# Patient Record
Sex: FEMALE | Race: AMERICAN INDIAN OR ALASKA NATIVE | Employment: UNEMPLOYED | URBAN - METROPOLITAN AREA
[De-identification: names, ages, dates, MRNs, and addresses within clinical notes are randomized per-mention and may not be internally consistent; named-entity substitution may affect disease eponyms.]

---

## 2019-06-18 ENCOUNTER — APPOINTMENT (OUTPATIENT)
Dept: GENERAL RADIOLOGY | Facility: CLINIC | Age: 59
End: 2019-06-18
Attending: PHYSICIAN ASSISTANT
Payer: COMMERCIAL

## 2019-06-18 ENCOUNTER — APPOINTMENT (OUTPATIENT)
Dept: CT IMAGING | Facility: CLINIC | Age: 59
End: 2019-06-18
Attending: PHYSICIAN ASSISTANT
Payer: COMMERCIAL

## 2019-06-18 ENCOUNTER — HOSPITAL ENCOUNTER (OUTPATIENT)
Facility: CLINIC | Age: 59
Setting detail: OBSERVATION
Discharge: HOME | End: 2019-06-19
Attending: EMERGENCY MEDICINE | Admitting: HOSPITALIST
Payer: COMMERCIAL

## 2019-06-18 DIAGNOSIS — R07.9 CHEST PAIN, UNSPECIFIED TYPE: ICD-10-CM

## 2019-06-18 DIAGNOSIS — E11.65 TYPE II OR UNSPECIFIED TYPE DIABETES MELLITUS WITH NEUROLOGICAL MANIFESTATIONS, UNCONTROLLED(250.62) (H): ICD-10-CM

## 2019-06-18 DIAGNOSIS — E11.49 TYPE II OR UNSPECIFIED TYPE DIABETES MELLITUS WITH NEUROLOGICAL MANIFESTATIONS, UNCONTROLLED(250.62) (H): ICD-10-CM

## 2019-06-18 DIAGNOSIS — R07.9 ACUTE CHEST PAIN: Primary | ICD-10-CM

## 2019-06-18 LAB
ALBUMIN SERPL-MCNC: 3.5 G/DL (ref 3.4–5)
ALP SERPL-CCNC: 117 U/L (ref 40–150)
ALT SERPL W P-5'-P-CCNC: 21 U/L (ref 0–50)
ANION GAP SERPL CALCULATED.3IONS-SCNC: 7 MMOL/L (ref 3–14)
AST SERPL W P-5'-P-CCNC: 16 U/L (ref 0–45)
BASOPHILS # BLD AUTO: 0 10E9/L (ref 0–0.2)
BASOPHILS NFR BLD AUTO: 0.5 %
BILIRUB SERPL-MCNC: 0.5 MG/DL (ref 0.2–1.3)
BUN SERPL-MCNC: 19 MG/DL (ref 7–30)
CALCIUM SERPL-MCNC: 8.5 MG/DL (ref 8.5–10.1)
CHLORIDE SERPL-SCNC: 113 MMOL/L (ref 94–109)
CO2 SERPL-SCNC: 23 MMOL/L (ref 20–32)
CREAT SERPL-MCNC: 0.92 MG/DL (ref 0.52–1.04)
D DIMER PPP FEU-MCNC: 0.9 UG/ML FEU (ref 0–0.5)
DIFFERENTIAL METHOD BLD: NORMAL
EOSINOPHIL # BLD AUTO: 0 10E9/L (ref 0–0.7)
EOSINOPHIL NFR BLD AUTO: 0.7 %
ERYTHROCYTE [DISTWIDTH] IN BLOOD BY AUTOMATED COUNT: 12.9 % (ref 10–15)
GFR SERPL CREATININE-BSD FRML MDRD: 69 ML/MIN/{1.73_M2}
GLUCOSE BLDC GLUCOMTR-MCNC: 142 MG/DL (ref 70–99)
GLUCOSE BLDC GLUCOMTR-MCNC: 170 MG/DL (ref 70–99)
GLUCOSE BLDC GLUCOMTR-MCNC: 227 MG/DL (ref 70–99)
GLUCOSE SERPL-MCNC: 256 MG/DL (ref 70–99)
HCT VFR BLD AUTO: 35.5 % (ref 35–47)
HGB BLD-MCNC: 11.9 G/DL (ref 11.7–15.7)
IMM GRANULOCYTES # BLD: 0 10E9/L (ref 0–0.4)
IMM GRANULOCYTES NFR BLD: 0.2 %
INTERPRETATION ECG - MUSE: NORMAL
INTERPRETATION ECG - MUSE: NORMAL
LIPASE SERPL-CCNC: 106 U/L (ref 73–393)
LYMPHOCYTES # BLD AUTO: 1.9 10E9/L (ref 0.8–5.3)
LYMPHOCYTES NFR BLD AUTO: 31.6 %
MCH RBC QN AUTO: 29.4 PG (ref 26.5–33)
MCHC RBC AUTO-ENTMCNC: 33.5 G/DL (ref 31.5–36.5)
MCV RBC AUTO: 88 FL (ref 78–100)
MONOCYTES # BLD AUTO: 0.3 10E9/L (ref 0–1.3)
MONOCYTES NFR BLD AUTO: 4.6 %
NEUTROPHILS # BLD AUTO: 3.7 10E9/L (ref 1.6–8.3)
NEUTROPHILS NFR BLD AUTO: 62.4 %
NRBC # BLD AUTO: 0 10*3/UL
NRBC BLD AUTO-RTO: 0 /100
PLATELET # BLD AUTO: 205 10E9/L (ref 150–450)
POTASSIUM SERPL-SCNC: 4.5 MMOL/L (ref 3.4–5.3)
PROT SERPL-MCNC: 8.1 G/DL (ref 6.8–8.8)
RBC # BLD AUTO: 4.05 10E12/L (ref 3.8–5.2)
SODIUM SERPL-SCNC: 143 MMOL/L (ref 133–144)
TROPONIN I SERPL-MCNC: <0.015 UG/L (ref 0–0.04)
WBC # BLD AUTO: 5.9 10E9/L (ref 4–11)

## 2019-06-18 PROCEDURE — 25000131 ZZH RX MED GY IP 250 OP 636 PS 637: Performed by: PHYSICIAN ASSISTANT

## 2019-06-18 PROCEDURE — 00000146 ZZHCL STATISTIC GLUCOSE BY METER IP

## 2019-06-18 PROCEDURE — 36415 COLL VENOUS BLD VENIPUNCTURE: CPT | Performed by: PHYSICIAN ASSISTANT

## 2019-06-18 PROCEDURE — 93005 ELECTROCARDIOGRAM TRACING: CPT

## 2019-06-18 PROCEDURE — 84484 ASSAY OF TROPONIN QUANT: CPT | Performed by: PHYSICIAN ASSISTANT

## 2019-06-18 PROCEDURE — 25000132 ZZH RX MED GY IP 250 OP 250 PS 637: Performed by: PHYSICIAN ASSISTANT

## 2019-06-18 PROCEDURE — 96361 HYDRATE IV INFUSION ADD-ON: CPT

## 2019-06-18 PROCEDURE — 99285 EMERGENCY DEPT VISIT HI MDM: CPT | Mod: 25

## 2019-06-18 PROCEDURE — 96360 HYDRATION IV INFUSION INIT: CPT | Mod: 59

## 2019-06-18 PROCEDURE — 85025 COMPLETE CBC W/AUTO DIFF WBC: CPT | Performed by: PHYSICIAN ASSISTANT

## 2019-06-18 PROCEDURE — 99220 ZZC INITIAL OBSERVATION CARE,LEVL III: CPT | Performed by: PHYSICIAN ASSISTANT

## 2019-06-18 PROCEDURE — 80053 COMPREHEN METABOLIC PANEL: CPT | Performed by: PHYSICIAN ASSISTANT

## 2019-06-18 PROCEDURE — 85379 FIBRIN DEGRADATION QUANT: CPT | Performed by: PHYSICIAN ASSISTANT

## 2019-06-18 PROCEDURE — 71260 CT THORAX DX C+: CPT

## 2019-06-18 PROCEDURE — 25000128 H RX IP 250 OP 636: Performed by: PHYSICIAN ASSISTANT

## 2019-06-18 PROCEDURE — G0378 HOSPITAL OBSERVATION PER HR: HCPCS

## 2019-06-18 PROCEDURE — 71046 X-RAY EXAM CHEST 2 VIEWS: CPT

## 2019-06-18 PROCEDURE — 83690 ASSAY OF LIPASE: CPT | Performed by: PHYSICIAN ASSISTANT

## 2019-06-18 PROCEDURE — 25000125 ZZHC RX 250: Performed by: PHYSICIAN ASSISTANT

## 2019-06-18 RX ORDER — ASPIRIN 81 MG/1
81 TABLET ORAL DAILY
Status: DISCONTINUED | OUTPATIENT
Start: 2019-06-19 | End: 2019-06-19 | Stop reason: HOSPADM

## 2019-06-18 RX ORDER — LISINOPRIL 20 MG/1
20 TABLET ORAL DAILY
COMMUNITY

## 2019-06-18 RX ORDER — NICOTINE POLACRILEX 4 MG
15-30 LOZENGE BUCCAL
Status: DISCONTINUED | OUTPATIENT
Start: 2019-06-18 | End: 2019-06-19 | Stop reason: HOSPADM

## 2019-06-18 RX ORDER — ACETAMINOPHEN 325 MG/1
650 TABLET ORAL EVERY 4 HOURS PRN
Status: DISCONTINUED | OUTPATIENT
Start: 2019-06-18 | End: 2019-06-19 | Stop reason: HOSPADM

## 2019-06-18 RX ORDER — IOPAMIDOL 755 MG/ML
500 INJECTION, SOLUTION INTRAVASCULAR ONCE
Status: COMPLETED | OUTPATIENT
Start: 2019-06-18 | End: 2019-06-18

## 2019-06-18 RX ORDER — ALUMINA, MAGNESIA, AND SIMETHICONE 2400; 2400; 240 MG/30ML; MG/30ML; MG/30ML
30 SUSPENSION ORAL EVERY 4 HOURS PRN
Status: DISCONTINUED | OUTPATIENT
Start: 2019-06-18 | End: 2019-06-19 | Stop reason: HOSPADM

## 2019-06-18 RX ORDER — NITROGLYCERIN 0.4 MG/1
0.4 TABLET SUBLINGUAL ONCE
Status: COMPLETED | OUTPATIENT
Start: 2019-06-18 | End: 2019-06-18

## 2019-06-18 RX ORDER — NALOXONE HYDROCHLORIDE 0.4 MG/ML
.1-.4 INJECTION, SOLUTION INTRAMUSCULAR; INTRAVENOUS; SUBCUTANEOUS
Status: DISCONTINUED | OUTPATIENT
Start: 2019-06-18 | End: 2019-06-19 | Stop reason: HOSPADM

## 2019-06-18 RX ORDER — ROSUVASTATIN CALCIUM 20 MG/1
20 TABLET, COATED ORAL EVERY EVENING
Status: DISCONTINUED | OUTPATIENT
Start: 2019-06-18 | End: 2019-06-19 | Stop reason: HOSPADM

## 2019-06-18 RX ORDER — LIDOCAINE 40 MG/G
CREAM TOPICAL
Status: DISCONTINUED | OUTPATIENT
Start: 2019-06-18 | End: 2019-06-19 | Stop reason: HOSPADM

## 2019-06-18 RX ORDER — NITROGLYCERIN 0.4 MG/1
0.4 TABLET SUBLINGUAL EVERY 5 MIN PRN
Status: DISCONTINUED | OUTPATIENT
Start: 2019-06-18 | End: 2019-06-19 | Stop reason: HOSPADM

## 2019-06-18 RX ORDER — TRAMADOL HYDROCHLORIDE 50 MG/1
50-100 TABLET ORAL EVERY 6 HOURS PRN
Status: DISCONTINUED | OUTPATIENT
Start: 2019-06-18 | End: 2019-06-19 | Stop reason: HOSPADM

## 2019-06-18 RX ORDER — ASPIRIN 81 MG/1
324 TABLET, CHEWABLE ORAL ONCE
Status: COMPLETED | OUTPATIENT
Start: 2019-06-18 | End: 2019-06-18

## 2019-06-18 RX ORDER — DEXTROSE MONOHYDRATE 25 G/50ML
25-50 INJECTION, SOLUTION INTRAVENOUS
Status: DISCONTINUED | OUTPATIENT
Start: 2019-06-18 | End: 2019-06-19 | Stop reason: HOSPADM

## 2019-06-18 RX ORDER — ROSUVASTATIN CALCIUM 20 MG/1
20 TABLET, COATED ORAL EVERY EVENING
COMMUNITY

## 2019-06-18 RX ORDER — LISINOPRIL 20 MG/1
20 TABLET ORAL DAILY
Status: DISCONTINUED | OUTPATIENT
Start: 2019-06-18 | End: 2019-06-19 | Stop reason: HOSPADM

## 2019-06-18 RX ORDER — PROPRANOLOL HYDROCHLORIDE 10 MG/1
10 TABLET ORAL DAILY
COMMUNITY

## 2019-06-18 RX ORDER — GABAPENTIN 600 MG/1
600 TABLET ORAL 3 TIMES DAILY
Status: DISCONTINUED | OUTPATIENT
Start: 2019-06-18 | End: 2019-06-19 | Stop reason: HOSPADM

## 2019-06-18 RX ORDER — GABAPENTIN 600 MG/1
600 TABLET ORAL 3 TIMES DAILY
COMMUNITY

## 2019-06-18 RX ADMIN — GABAPENTIN 600 MG: 600 TABLET, FILM COATED ORAL at 21:10

## 2019-06-18 RX ADMIN — INSULIN GLARGINE 30 UNITS: 100 INJECTION, SOLUTION SUBCUTANEOUS at 21:10

## 2019-06-18 RX ADMIN — NITROGLYCERIN 0.4 MG: 0.4 TABLET SUBLINGUAL at 21:07

## 2019-06-18 RX ADMIN — NITROGLYCERIN 0.4 MG: 0.4 TABLET SUBLINGUAL at 21:56

## 2019-06-18 RX ADMIN — NITROGLYCERIN 0.4 MG: 0.4 TABLET SUBLINGUAL at 15:20

## 2019-06-18 RX ADMIN — NITROGLYCERIN 0.4 MG: 0.4 TABLET SUBLINGUAL at 17:31

## 2019-06-18 RX ADMIN — LISINOPRIL 20 MG: 20 TABLET ORAL at 21:10

## 2019-06-18 RX ADMIN — LIDOCAINE HYDROCHLORIDE 30 ML: 20 SOLUTION ORAL; TOPICAL at 17:32

## 2019-06-18 RX ADMIN — SODIUM CHLORIDE 75 ML: 9 INJECTION, SOLUTION INTRAVENOUS at 15:28

## 2019-06-18 RX ADMIN — IOPAMIDOL 58 ML: 755 INJECTION, SOLUTION INTRAVENOUS at 15:28

## 2019-06-18 RX ADMIN — TRAMADOL HYDROCHLORIDE 50 MG: 50 TABLET, COATED ORAL at 19:25

## 2019-06-18 RX ADMIN — ASPIRIN 81 MG 324 MG: 81 TABLET ORAL at 14:42

## 2019-06-18 RX ADMIN — ACETAMINOPHEN 650 MG: 325 TABLET, FILM COATED ORAL at 21:10

## 2019-06-18 RX ADMIN — ROSUVASTATIN CALCIUM 20 MG: 20 TABLET, FILM COATED ORAL at 21:10

## 2019-06-18 ASSESSMENT — ENCOUNTER SYMPTOMS
NAUSEA: 1
SHORTNESS OF BREATH: 1
COUGH: 0

## 2019-06-18 ASSESSMENT — MIFFLIN-ST. JEOR
SCORE: 1210.04
SCORE: 1232.72

## 2019-06-18 NOTE — ED NOTES
[facility]  ED Nurse Handoff Report    Natasha Matson is a 58 year old female   ED Chief complaint: Chest Pain and Shortness of Breath  . ED Diagnosis:   Final diagnoses:   Chest pain, unspecified type     Allergies:   Allergies   Allergen Reactions     Lyrica [Pregabalin]      Nausea     Sulfa Drugs        Code Status: Full Code  Activity level - Baseline/Home:  Independent. Activity Level - Current:   Stand by Assist. Lift room needed: No. Bariatric: No   Needed: No   Isolation: No. Infection: Not Applicable.     Vital Signs:   Vitals:    06/18/19 1525 06/18/19 1730 06/18/19 1735 06/18/19 1740   BP:  (!) 172/98     Pulse:  83     Resp: 24      Temp:       TempSrc:       SpO2: 100%  99% 100%   Weight:       Height:         Cardiac Rhythm:  ,       Sinus tachycardia  Otherwise normal ECG  No previous ECGs available  Pain level: 0-10 Pain Scale: 8  Patient confused: No. Patient Falls Risk: Yes.   Elimination Status: Has voided   Patient Report / Focused Assessment:    Pt had back pain a couple weeks ago and her daughter was applying pressure to her back and reportedly broke rib on left side.  Pt now has shortness of breath and left sided anterior chest pain.    Tests PerformED / Abnormal Results:    CT Chest Pulmonary Embolism w Contrast   Final Result   IMPRESSION:   1. There is no pulmonary embolus, aortic aneurysm or dissection.   2. Coronary artery atherosclerotic calcifications.       AVIVA NUNN MD      XR Chest 2 Views   Final Result   IMPRESSION: No acute abnormality.      AVIVA NUNN MD      Treatments provided: PIV, LABS, MEDS, XRAY, CT, BP AND PULSE OX MONITORING. EKG  Family Comments: AT BEDSIDE  OBS brochure/video discussed/provided to patient:  Yes  ED Medications:   Medications   aspirin (ASA) chewable tablet 324 mg (324 mg Oral Given 6/18/19 1442)   nitroGLYcerin (NITROSTAT) sublingual tablet 0.4 mg (0.4 mg Sublingual Given 6/18/19 1520)   0.9% sodium chloride  BOLUS (75 mLs Intravenous New Bag 6/18/19 1528)   iopamidol (ISOVUE-370) solution 500 mL (58 mLs Intravenous Given 6/18/19 1528)   nitroGLYcerin (NITROSTAT) sublingual tablet 0.4 mg (0.4 mg Sublingual Given 6/18/19 1731)   lidocaine HCl (XYLOCAINE) 2 % 15 mL, alum & mag hydroxide-simethicone (MYLANTA ES/MAALOX  ES) 15 mL GI Cocktail (30 mLs Oral Given 6/18/19 1732)     Drips infusing:  Yes  For the majority of the shift, the patient's behavior Green. Interventions performed were NONE.    Severe Sepsis OR Septic Shock Diagnosis Present: No    ED Nurse Name/Phone Number: Chemo Lacey RN [phone]  5:15 PM    RECEIVING UNIT ED HANDOFF REVIEW    Above ED Nurse Handoff Report was reviewed: Yes  Reviewed by: Keerthi Gutierrez on June 18, 2019 at 5:42 PM

## 2019-06-18 NOTE — H&P
[facility] / Observation Unit  History and Physical Exam     Natasha Matson MRN# [id number]   YOB: 1960 Age: 58 year old      Date of Admission:  6/18/2019    Primary care provider: No Ref-Primary, Physician          Assessment:   Natasha Matson is a 58 year old female with a PMH significant for DM with neuropathy, hx of CVA, HTN, and HLP, who presents with chest pain.   Work up in ED reveals: VSS. CMP and CBC are unremarkable other than glucose of 256. Lipase is normal and troponin is undetectable. D-dimer is elevated at 0.9 and CT of the chest was negative for PE. CXR is clear, old rib fractures. EKG is sinus tachycardia. She received 324 mg PO ASA, GI cocktail and sublingual nitroglycerin x2.   Patient is being registered to observation for further evaluation and to rule out possible ACS.     1. Acute Chest pain: likely musculoskeletal given recent hx of possible fractured ribs but pain today is in a different location and has multiple risk factors for CAD; DM, HTN, HLP, tobacco use, CVA and family hx of early CAD. Initial work up is non ischemic. Will monitor on tele, trend troponins and get stress echo in AM.  2. DM with neuropathy - resume home Lantus tonight and gabapentin. Will hold meal time insulin and Januvia. Hold morning Lantus until cleared for PO intake.   3. HTN - resume home lisinopril, hold propranolol  4. HLP - resume statin         Plan:     1. [place] to Observation  2. Continue telemetry  3. Follow serial troponins   4. Stress testing with Stress Echo  5. Cont Aspirin EC 81 mg po daily  6. Blood pressure control  7. Morphine and nitroglycerine PRN for pain    8. regular diet, No caffeine if Nuclear testing selected  9. DVT prophylaxis: pt at low risk, encourage ambulation  10. Code Status: full code  11. Dispo: anticipate discharge tomorrow                  Chief Complaint:   Chest Pain         History of Present Illness:   Natasha Matson is a 58 year old female  with a PMH significant for DM with neuropathy, hx of CVA, HTN, and HLP, who presents with chest pain. Pt reports onset of anterior left sided chest pain this morning while at rest. The was associated with nausea and vomiting. She denies any relieving or exacerbating factors. She denies association to exertion. She notes worsening pain with deep breath but relates this to her lateral ribs. She notes associated SOB and clamminess as well. She also notes episodes of nausea and clamminess over the past 3 days that is not related to exertion. She notes that she had her daughter crack her back 2 weeks and she felt a pop in her ribs. She was evaluated, pt reports fracturing ribs on both sides, per chart review, there were old fractures noted on the left, no acute appearing fractures. She denies fever, chills, cough, abdominal pain, diarrhea, constipation or dysuria. She notes SOB with lying down. She notes tobacco use, 1-2 cigarettes per day. Denies alcohol use. She notes hx of early CAD in her mother,  of CAD in her 60s.              Past Medical History:     Past Medical History:   Diagnosis Date     Essential hypertension, benign      High cholesterol      Polyneuropathy in diabetes (H)      Type II or unspecified type diabetes mellitus without mention of complication, not stated as uncontrolled      Type II or unspecified type diabetes mellitus without mention of complication, uncontrolled 3/21/07    Hospitalized               Past Surgical History:     Past Surgical History:   Procedure Laterality Date     C TOTAL ABDOM HYSTERECTOMY      uncertain if ovaries removed     HC TYMPANOPLASTY W/O MASTOID, INIT/REV W/O OSS CHAIN RECONST  1995    right ear               Social History:     Social History     Socioeconomic History     Marital status:      Spouse name: Not on file     Number of children: Not on file     Years of education: Not on file     Highest education level: Not on file   Occupational  History     Not on file   Social Needs     Financial resource strain: Not on file     Food insecurity:     Worry: Not on file     Inability: Not on file     Transportation needs:     Medical: Not on file     Non-medical: Not on file   Tobacco Use     Smoking status: Former Smoker     Packs/day: 0.10     Types: Cigarettes     Last attempt to quit: 2010     Years since quittin.7     Tobacco comment: 1 cig/day   Substance and Sexual Activity     Alcohol use: No     Drug use: No     Sexual activity: Not on file   Lifestyle     Physical activity:     Days per week: Not on file     Minutes per session: Not on file     Stress: Not on file   Relationships     Social connections:     Talks on phone: Not on file     Gets together: Not on file     Attends [employer] service: Not on file     Active member of club or organization: Not on file     Attends meetings of clubs or organizations: Not on file     Relationship status: Not on file     Intimate partner violence:     Fear of current or ex partner: Not on file     Emotionally abused: Not on file     Physically abused: Not on file     Forced sexual activity: Not on file   Other Topics Concern     Not on file   Social History Narrative     Not on file               Family History:   Mother - DM  Sister - DM         Allergies:      Allergies   Allergen Reactions     Lyrica [Pregabalin]      Nausea     Sulfa Drugs                Medications:     Prior to Admission medications    Medication Sig Last Dose Taking? Auth Provider   ASPIRIN 81 MG OR TABS one daily 2019 at am Yes Meredith Stanley PA-C   gabapentin (NEURONTIN) 600 MG tablet Take 600 mg by mouth 3 times daily 2019 at Unknown time Yes Unknown, Entered By History   insulin aspart (NOVOLOG FLEXPEN) 100 UNIT/ML pen Inject 2-8 Units Subcutaneous 3 times daily (with meals) Per sliding scale  Yes Unknown, Entered By History   insulin glargine (LANTUS SOLOSTAR PEN) 100 UNIT/ML pen Inject 30 Units  "Subcutaneous 2 times daily 6/17/2019 at Unknown time Yes Unknown, Entered By History   lisinopril (PRINIVIL/ZESTRIL) 20 MG tablet Take 20 mg by mouth daily 6/17/2019 at am Yes Unknown, Entered By History   propranolol (INDERAL) 10 MG tablet Take 10 mg by mouth daily 6/17/2019 at am Yes Unknown, Entered By History   rosuvastatin (CRESTOR) 20 MG tablet Take 20 mg by mouth every evening 6/17/2019 at Unknown time Yes Unknown, Entered By History   sitagliptin (JANUVIA) 100 MG tablet Take 100 mg by mouth daily 6/17/2019 at am Yes Unknown, Entered By History   traMADol (ULTRAM) 50 MG tablet Take 1-2 tablets by mouth every 6 hours as needed for pain.  Yes Tani Mancilla MD   ALCOHOL WIPES 70 % PADS single use pads   Nitesh Cheek MD   aspirin 325 MG tablet Take 1 tablet by mouth daily for 14 days.   Tani Mancilla MD   glucose blood VI test strips (ONE TOUCH ULTRA TEST) strip 1 strip by In Vitro route 4 times daily (before meals and nightly).   Meredith Stanley PA-C   Insulin Pen Needle (B-D U/F PEN NEEDLE) 31G X 8 MM MISC 1 each 3 times daily (before meals).   Meagan Arreaga, NP   INSULIN SYRINGE 1CCC/30GX1/2\" MISC per patient preference   Jackie Patel, CNP   lisinopril (PRINIVIL,ZESTRIL) 10 MG tablet Take 1 tablet by mouth daily for 14 days.   Tani Mancilla MD   ONETOUCH ULTRASOFT LANCETS MISC test blood sugar 4 times per day and as needed    Nitesh Cheek MD   ORDER FOR DME needle disposition container   Nitesh Cheek MD              Review of Systems:   A Comprehensive greater than 10 system review of systems was carried out.  Pertinent positives and negatives are noted above.  Otherwise negative for contributory information.     Constitutional, neuro, ENT, endocrine, pulmonary, cardiac, gastrointestinal, genitourinary, musculoskeletal, integument and psychiatric systems are negative, except as otherwise noted.           Physical Exam:   Blood pressure 165/88, " "pulse 89, temperature 98.6  F (37  C), temperature source Oral, resp. rate 28, height 1.626 m (5' 4\"), weight 63.5 kg (140 lb), SpO2 100 %.    GENERAL:  Comfortable.  PSYCH: pleasant, oriented, No acute distress.  HEENT:  Atraumatic, normocephalic. Normal conjunctiva, normal hearing, and oropharynx is normal.  NECK:  Supple, no neck vein distention.  HEART:  Normal S1, S2 with no murmur, no pericardial rub, gallops or S3 or S4.  LUNGS:  Clear to auscultation, normal Respiratory effort. No wheezing, rales or ronchi.  GI:  Soft, normal bowel sounds. Non-tender, non distended.   EXTREMITIES:  No pedal edema, +2 pulses bilateral and equal.  SKIN:  Dry to touch, No rash, wound or ulcerations.  NEUROLOGIC:  CN 2-12 intact, BL 5/5 symmetric upper and lower extremity strength, sensation is intact with no focal deficits.              Data:     EKG demonstrates:  sinus tachycardia.    Recent Labs   Lab 06/18/19  1420   WBC 5.9   HGB 11.9   HCT 35.5   MCV 88        Recent Labs   Lab 06/18/19  1420      POTASSIUM 4.5   CHLORIDE 113*   CO2 23   ANIONGAP 7   *   BUN 19   CR 0.92   GFRESTIMATED 69   GFRESTBLACK 80   RODERICK 8.5   PROTTOTAL 8.1   ALBUMIN 3.5   BILITOTAL 0.5   ALKPHOS 117   AST 16   ALT 21     Recent Labs   Lab 06/18/19  1420   DD 0.9*     Recent Labs   Lab 06/18/19  1420   LIPASE 106     Recent Labs   Lab 06/18/19  1420   TROPI <0.015       Recent Results (from the past 48 hour(s))   XR Chest 2 Views    Narrative    CHEST TWO VIEW   6/18/2019 3:05 PM     HISTORY: Chest pain.    COMPARISON: 3/20/2007.    FINDINGS: The heart size is normal. The lungs are clear. No  pneumothorax or pleural effusion. Old bilateral rib fractures. Old  left clavicle fracture.      Impression    IMPRESSION: No acute abnormality.    AVIVA NUNN MD   CT Chest Pulmonary Embolism w Contrast    Narrative    CT CHEST PULMONARY EMBOLISM WITH CONTRAST  6/18/2019 3:38 PM    HISTORY:  Chest pain and shortness of breath. Positive " D-dimer.    TECHNIQUE: Scans obtained from the apices through the diaphragm with  IV contrast. 58 mL Isovue-370 injected. Radiation dose for this scan  was reduced using automated exposure control, adjustment of the mA  and/or kV according to patient size, or iterative reconstruction  technique.    COMPARISON:  None.    FINDINGS:  Evaluation of the pulmonary arterial system shows no  evidence of embolus. The thoracic aorta is calcified and slightly  tortuous. No aortic aneurysm or dissection. There are coronary artery  atherosclerotic calcifications. The heart size is normal. There is no  mediastinal, hilar or axillary lymph node enlargement. There is mild  dependent atelectasis bilaterally. The lungs are otherwise clear. No  pneumothorax or pleural effusion. Multiple old bilateral rib  fractures. Images through the upper abdomen show no acute  abnormalities. The gallbladder is absent.      Impression    IMPRESSION:  1. There is no pulmonary embolus, aortic aneurysm or dissection.  2. Coronary artery atherosclerotic calcifications.     MD Cally SOFIA PA-C

## 2019-06-18 NOTE — ED PROVIDER NOTES
Emergency Department Attending Supervision Note  6/18/2019  4:01 PM    I evaluated this patient in conjunction with Isaac Simms PA-C    Briefly, the patient presented with chest pain.  1 hour PTA, patient developed left sided, non-radiating chest pain.  No associated nausea, vomiting, or diaphoresis.  No clear exacerbating or alleviating factors.  Hx of previous CVA, PFO, HTN, HLD, DM.  No previous CAD.  Daughter providing chest massage recently, and believes she may have caused an acute on chronic fracture to the patient's ribs she has noted persistent right-sided chest discomfort since that time.    On my exam:  Resting comfortably  Lungs CTAB  RRR, S1 and S2 present  No tenderness to palpation over chest wall  No LE edema or asymmetry    Results:  Labs Ordered and Resulted from Time of ED Arrival Up to the Time of Departure from the ED   COMPREHENSIVE METABOLIC PANEL - Abnormal; Notable for the following components:       Result Value    Chloride 113 (*)     Glucose 256 (*)     All other components within normal limits   D DIMER QUANTITATIVE - Abnormal; Notable for the following components:    D Dimer 0.9 (*)     All other components within normal limits   GLUCOSE BY METER - Abnormal; Notable for the following components:    Glucose 227 (*)     All other components within normal limits   CBC WITH PLATELETS DIFFERENTIAL   LIPASE   TROPONIN I   CARDIAC CONTINUOUS MONITORING     CT Chest Pulmonary Embolism w Contrast   Final Result   IMPRESSION:   1. There is no pulmonary embolus, aortic aneurysm or dissection.   2. Coronary artery atherosclerotic calcifications.       AVIVA NUNN MD      XR Chest 2 Views   Final Result   IMPRESSION: No acute abnormality.      AVIVA NUNN MD        ED course:    My impression is chest pain.  Precise etiology not clear.  EKG demonstrates sinus rhythm, without evidence of ST segment elevation or depression, though subtle T wave inversion in lead aVL which appears new compared  with previous.  Repeat EKG demonstrates no interval ST segment elevation or depression.  Initial troponin returned undetectable.  Patient does have multiple risk factors including previous CVA, hypertension, hyperlipidemia, and diabetes as well as elevated HEART Score.  Patient provided the above interventions, noting improvements in symptoms, and was essentially chest pain-free following sublingual nitroglycerin.  No evidence of PE based on advanced imaging or acute aortic pathology.  Will be been to the observation unit for further treatment and care.    Diagnosis    ICD-10-CM    1. Chest pain, unspecified type R07.9          Geraldo Hooper MD Roach, Brian Donald, MD  06/18/19 1829

## 2019-06-18 NOTE — PHARMACY-ADMISSION MEDICATION HISTORY
Admission medication history interview status for this patient is complete. See [facility] admission navigator for allergy information, prior to admission medications and immunization status.     Medication history interview source(s):Patient and Family  Medication history resources (including written lists, pill bottles, clinic record):None  [facility]    Changes made to PTA medication list:  Added: januvia, gabapentin, propranolol, crestor  Deleted: simvastatin,   Changed: lantus, novolog, lisinopril    Actions taken by pharmacist (provider contacted, etc):None     Additional medication history information:None    Medication reconciliation/reorder completed by provider prior to medication history? No    Do you take OTC medications (eg tylenol, ibuprofen, fish oil, eye/ear drops, etc)? yes(Y/N)    For patients on insulin therapy: yes (Y/N)  Lantus/levemir/NPH/Mix 70/30 dose:   (Y/N) (see Med list for doses) yes  Sliding scale Novolog Y/N yes  If Yes, do you have a baseline novolog pre-meal dose:  units with meals no  Patients eat three meals a day:   Y/N    How many episodes of hypoglycemia do you have per week: _______  How many missed doses do you have per week: ______  How many times do you check your blood glucose per day: _______  Do you have a Continuous glucose monitor (CGM)   Y/N (remind pt that not approved for hospital use)   Any Barriers to therapy - Be specific :  cost of medications, comfortable with giving injections (if applicable), comfortable and confident with current diabetes regimen: Y/N ______________ no      Prior to Admission medications    Medication Sig Last Dose Taking? Auth Provider   ASPIRIN 81 MG OR TABS one daily 6/17/2019 at am Yes Meredith Stanley PA-C   gabapentin (NEURONTIN) 600 MG tablet Take 600 mg by mouth 3 times daily 6/17/2019 at Unknown time Yes Unknown, Entered By History   insulin aspart (NOVOLOG FLEXPEN) 100 UNIT/ML pen Inject 2-8 Units Subcutaneous 3  "times daily (with meals) Per sliding scale  Yes Unknown, Entered By History   insulin glargine (LANTUS SOLOSTAR PEN) 100 UNIT/ML pen Inject 30 Units Subcutaneous 2 times daily 6/17/2019 at Unknown time Yes Unknown, Entered By History   lisinopril (PRINIVIL/ZESTRIL) 20 MG tablet Take 20 mg by mouth daily 6/17/2019 at am Yes Unknown, Entered By History   propranolol (INDERAL) 10 MG tablet Take 10 mg by mouth daily 6/17/2019 at am Yes Unknown, Entered By History   rosuvastatin (CRESTOR) 20 MG tablet Take 20 mg by mouth every evening 6/17/2019 at Unknown time Yes Unknown, Entered By History   sitagliptin (JANUVIA) 100 MG tablet Take 100 mg by mouth daily 6/17/2019 at am Yes Unknown, Entered By History   traMADol (ULTRAM) 50 MG tablet Take 1-2 tablets by mouth every 6 hours as needed for pain.  Yes Tani Mancilla MD   ALCOHOL WIPES 70 % PADS single use pads   Nitesh Cheek MD   aspirin 325 MG tablet Take 1 tablet by mouth daily for 14 days.   Tani Mancilla MD   glucose blood VI test strips (ONE TOUCH ULTRA TEST) strip 1 strip by In Vitro route 4 times daily (before meals and nightly).   Meredith Stanley, CLEMENTE   Insulin Pen Needle (B-D U/F PEN NEEDLE) 31G X 8 MM MISC 1 each 3 times daily (before meals).   Meagan Arreaga, NP   INSULIN SYRINGE 1CCC/30GX1/2\" MISC per patient preference   Jackie Patel, CNP   lisinopril (PRINIVIL,ZESTRIL) 10 MG tablet Take 1 tablet by mouth daily for 14 days.   Tani Mancilla MD   ONETOUCH ULTRASOFT LANCETS MISC test blood sugar 4 times per day and as needed    Nitesh Cheek MD   ORDER FOR DME needle disposition container   Nitesh Cheek MD         "

## 2019-06-18 NOTE — ED TRIAGE NOTES
Pt had back pain a couple weeks ago and her daughter was applying pressure to her back and reportedly broke rib on left side.  Pt now has shortness of breath and left sided anterior chest pain.

## 2019-06-18 NOTE — ED PROVIDER NOTES
History     Chief Complaint:  Chest Pain and Shortness of Breath    HPI   Natasha Matson is a 58 year old female with a history of strokes and type 2 diabetes mellitus who presents with chest pain and shortness of breath. The patient reports that last week she went to a provider who was manipulating her back when they broke one of her left ribs. Today, she presents to the ED right-sided chest pain that she describes as sharp and throbbing and rates an 8/10. She states she is nauseated and vomited 30 minutes prior to arrival.  She also reports associated shortness of breath.  Patient states that her symptoms are made worse with exertion.  She denies leg swelling and coughing.  The patient states that she has not had similar symptoms prior.    CARDIAC RISK FACTORS:  Sex:    Female  Tobacco:   Yes  Hypertension:   Yes  Hyperlipidemia:  No  Diabetes:   Yes  Family History:  No    PE/DVT RISK FACTORS:  Sex:    Female  Hormones:   No  Tobacco:   Yes  Cancer:   No  Travel:   No  Surgery:   No  Other immobilization: No  Personal history:  No  Family history:  No    Allergies:  Lyrica  Sulfa Drugs    Medications:    Aspirin  Lisinopril  Zocor  Ultram    Past Medical History:    Hypertension  Polyneuropathy in diabetes  Type II diabetes mellitus    Past Surgical History:    Hysterectomy  Tympanoplasty    Family History:    History reviewed. No pertinent family history.     Social History:  Smoking status: Yes, 1 cigarette/week  Alcohol use: No  Drug use: No  PCP: Physician No Ref-Primary  Marital Status:   [2]    Review of Systems   Respiratory: Positive for shortness of breath. Negative for cough.    Cardiovascular: Positive for chest pain. Negative for leg swelling.   Gastrointestinal: Positive for nausea.   All other systems reviewed and are negative.    Physical Exam     Patient Vitals for the past 24 hrs:   BP Temp Temp src Heart Rate Resp SpO2 Height Weight   06/18/19 1416 170/90 98.6  F (37  C) Oral 105 20  "100 % 1.626 m (5' 4\") 63.5 kg (140 lb)     Physical Exam  General: Alert and cooperative with exam. Resting comfortably on gurney  Head:  Scalp is NC/AT  Eyes:  No scleral icterus, PERRL  ENT:  The external nose and ears are normal.   Neck:  Normal range of motion without rigidity.  CV:  Regular rate and rhythm    No pathologic murmur, rubs, or gallops.  Resp:  Breath sounds are clear bilaterally.  No crackles, wheezes, rhonchi.    Non-labored, no retractions or accessory muscle use  GI:  Abdomen is soft, no distension, no tenderness, no masses. No peritoneal signs.  Bowel sounds present in all quadrants  :  No suprapubic or flank tenderness  MS:  No lower extremity edema or asymmetric calf swelling.  Skin:  Warm and dry, No rash or lesions noted.  Neuro: Oriented. No gross motor deficits.  Psych: Awake. Alert. Normal affect. Appropriate interactions.    Emergency Department Course   ECG (14:04:46):  Rate 104 bpm. NH interval 158. QRS duration 80. QT/QTc 352/462. P-R-T axes 65 27 78. Sinus tachycardia. Otherwise normal ECG. Interpreted at 1404 by Geraldo Hooper MD.    ECG (18:05:14):  Rate 86 bpm. NH interval 170. QRS duration 76. QT/QTc 376/449. P-R-T axes 37 11 66. Normal sinus rhythm. Normal ECG. Interpreted at 1830 by Geraldo Hooper MD.    Imaging:  Radiographic findings were communicated with the patient who voiced understanding of the findings.  CT Chest Pulmonary Embolism w Contrast  1. There is no pulmonary embolus, aortic aneurysm or dissection.  2. Coronary artery atherosclerotic calcifications.   As read by Radiology.    XR Chest 2 Views  No acute abnormality.  As read by Radiology.    Laboratory:  CBC: WNL (WBC 5.9, HGB 11.9, )  CMP: Chloride 113 (H), Glucose 256 (H), o/w WNL (Creatinine 0.92)  Lipase: 106  Troponin I (1420): <0.015  D-dimer: 0.9 (H)  Glucose by meter: 227 (H)    Interventions:  Medications   aspirin (ASA) chewable tablet 324 mg (324 mg Oral Given 6/18/19 1442) "   nitroGLYcerin (NITROSTAT) sublingual tablet 0.4 mg (0.4 mg Sublingual Given 19 1520)   0.9% sodium chloride BOLUS (75 mLs Intravenous New Bag 19 1528)   iopamidol (ISOVUE-370) solution 500 mL (58 mLs Intravenous Given 19 1528)   nitroGLYcerin (NITROSTAT) sublingual tablet 0.4 mg (0.4 mg Sublingual Given 19 1731)   lidocaine HCl (XYLOCAINE) 2 % 15 mL, alum & mag hydroxide-simethicone (MYLANTA ES/MAALOX  ES) 15 mL GI Cocktail (30 mLs Oral Given 19 173)     Emergency Department Course:  Past medical records, nursing notes, and vitals reviewed.  1407: I performed an exam of the patient and obtained history, as documented above.  The patient was sent for a chest x-ray and chest pulmonary embolism CT while in the emergency department, findings above.  IV inserted and blood drawn.  1540: Rechecked the patient.  She reported some improvement following initial dose of nitro.  Findings and plan explained to the Patient who consents to admission.   1653:   Discussed the patient with Jayro who is accepting for Dr. Clinton, who will admit the patient to a observation unit bed for further monitoring, evaluation, and treatment.   Impression & Plan    Medical Decision Makin year old female who presents with chest pain.  Patient history and records reviewed. Broad differential considered including: ACS, PE, dissection, pneumothorax, pneumonia, esophageal rupture, musculoskeletal chest wall pain, referred pain from abdomen.  Patient well appearing with non-concerning vitals.  Initial EKG demonstrated possible T wave inversion in aVL, no other ischemic changes, repeat EKG unremarkable which no longer shows this.  Initial troponin was negative and lab work otherwise unremarkable.    I did consider pulmonary embolism and d-dimer was positive leading to CT scan of the chest which showed no evidence of pulmonary embolism, pneumonia, aortic dissection but did demonstrate some signs of coronary  atherosclerosis.  No specific risk factors for dissection feel this is exceedingly unlikely.  LFTs and lipase are normal and the patient does not have any abdominal tenderness to suggest referred pain from abdomen.    The patient is a heart score of 4, and after discussion of risks versus benefits she will be admitted for observation and further serial troponins and inpatient stress testing.  Additionally she did have some relief with nitro and there were some findings of coronary artery disease on CT of the chest further supporting decision of admission.  I discussed this with Dr. Clinton who will bring the patient in for observation.  The patient consents to admission all questions were answered.  Diagnosis:    ICD-10-CM   1. Chest pain, unspecified type R07.9     Disposition:  Admitted to observation unit.    Prince Kaiser  6/18/2019   [facility] EMERGENCY DEPARTMENT  I, Prince Kaiser, am serving as a scribe at 2:07 PM on 6/18/2019 to document services personally performed by Isaac Simms PA-C based on my observations and the provider's statements to me.      Isaac Simms PA-C  06/18/19 1848

## 2019-06-19 ENCOUNTER — APPOINTMENT (OUTPATIENT)
Dept: CARDIOLOGY | Facility: CLINIC | Age: 59
End: 2019-06-19
Attending: PHYSICIAN ASSISTANT
Payer: COMMERCIAL

## 2019-06-19 VITALS
BODY MASS INDEX: 24.75 KG/M2 | TEMPERATURE: 98.5 F | RESPIRATION RATE: 17 BRPM | HEART RATE: 80 BPM | DIASTOLIC BLOOD PRESSURE: 61 MMHG | SYSTOLIC BLOOD PRESSURE: 101 MMHG | WEIGHT: 145 LBS | HEIGHT: 64 IN | OXYGEN SATURATION: 98 %

## 2019-06-19 LAB
GLUCOSE BLDC GLUCOMTR-MCNC: 61 MG/DL (ref 70–99)
GLUCOSE BLDC GLUCOMTR-MCNC: 74 MG/DL (ref 70–99)
GLUCOSE BLDC GLUCOMTR-MCNC: 78 MG/DL (ref 70–99)

## 2019-06-19 PROCEDURE — 93325 DOPPLER ECHO COLOR FLOW MAPG: CPT | Mod: TC

## 2019-06-19 PROCEDURE — 93321 DOPPLER ECHO F-UP/LMTD STD: CPT | Mod: 26 | Performed by: INTERNAL MEDICINE

## 2019-06-19 PROCEDURE — 25000132 ZZH RX MED GY IP 250 OP 250 PS 637: Performed by: PHYSICIAN ASSISTANT

## 2019-06-19 PROCEDURE — 96372 THER/PROPH/DIAG INJ SC/IM: CPT

## 2019-06-19 PROCEDURE — 93325 DOPPLER ECHO COLOR FLOW MAPG: CPT | Mod: 26 | Performed by: INTERNAL MEDICINE

## 2019-06-19 PROCEDURE — 99217 ZZC OBSERVATION CARE DISCHARGE: CPT | Performed by: PHYSICIAN ASSISTANT

## 2019-06-19 PROCEDURE — G0378 HOSPITAL OBSERVATION PER HR: HCPCS

## 2019-06-19 PROCEDURE — 93018 CV STRESS TEST I&R ONLY: CPT | Performed by: INTERNAL MEDICINE

## 2019-06-19 PROCEDURE — 93350 STRESS TTE ONLY: CPT | Mod: 26 | Performed by: INTERNAL MEDICINE

## 2019-06-19 PROCEDURE — 00000146 ZZHCL STATISTIC GLUCOSE BY METER IP

## 2019-06-19 RX ORDER — IBUPROFEN 200 MG
200 TABLET ORAL EVERY 8 HOURS PRN
COMMUNITY
Start: 2019-06-19

## 2019-06-19 RX ORDER — ACETAMINOPHEN 500 MG
500-1000 TABLET ORAL 3 TIMES DAILY
COMMUNITY
Start: 2019-06-19

## 2019-06-19 RX ORDER — HYDROXYZINE HYDROCHLORIDE 25 MG/1
12.5-25 TABLET, FILM COATED ORAL EVERY 6 HOURS PRN
Qty: 10 TABLET | Refills: 0 | Status: SHIPPED | OUTPATIENT
Start: 2019-06-19

## 2019-06-19 RX ADMIN — TRAMADOL HYDROCHLORIDE 50 MG: 50 TABLET, COATED ORAL at 03:48

## 2019-06-19 RX ADMIN — ACETAMINOPHEN 650 MG: 325 TABLET, FILM COATED ORAL at 08:05

## 2019-06-19 RX ADMIN — ASPIRIN 81 MG: 81 TABLET, COATED ORAL at 08:05

## 2019-06-19 RX ADMIN — GABAPENTIN 600 MG: 600 TABLET, FILM COATED ORAL at 08:05

## 2019-06-19 RX ADMIN — NITROGLYCERIN 0.4 MG: 0.4 TABLET SUBLINGUAL at 03:47

## 2019-06-19 RX ADMIN — NITROGLYCERIN 0.4 MG: 0.4 TABLET SUBLINGUAL at 08:07

## 2019-06-19 NOTE — PLAN OF CARE
Patient's After Visit Summary was reviewed with patient.   Patient verbalized understanding of After Visit Summary, recommended follow up and was given an opportunity to ask questions.   Discharge medications sent home with patient/family: YES, Atarax PO meds.     Discharged with daughter          OBSERVATION patient END time: 1:00 PM

## 2019-06-19 NOTE — PLAN OF CARE
"PRIMARY DIAGNOSIS: CHEST PAIN  OUTPATIENT/OBSERVATION GOALS TO BE MET BEFORE DISCHARGE:  1. Ruled out acute coronary syndrome (negative or stable Troponin):  Yes  2. Pain Status: Pain improved with tylenol and nitroglycerin.    3. Appropriate provocative testing performed: Yes  - Stress Test Procedure: Stress Echo  - Interpretation of cardiac rhythm per telemetry tech: Pt off tele for test.     4. Cleared by Consultants (if applicable):N/A  5. Return to near baseline physical activity: Yes  Discharge Planner Nurse   Safe discharge environment identified: Yes  Barriers to discharge: Yes       Entered by: Sheila Chris 06/19/2019 8:31 AM   Pt alert and orientated. VSS. BP soft. Complained of some \"heart pain\" PRN tylenol nitroglycerin given. Up SBA. Stress ECHO completed. Will await ECHO results. Probable discharge today.   Please review provider order for any additional goals.   Nurse to notify provider when observation goals have been met and patient is ready for discharge.    "

## 2019-06-19 NOTE — DISCHARGE SUMMARY
[facility] Outpatient / Observation Unit  Discharge Summary        Natasha Matson MRN# [id number]   YOB: 1960 Age: 58 year old     Date of Admission:  6/18/2019  Date of Discharge:  6/19/2019  1:01 PM  Admitting Physician:  Kunal Clinton MD  Discharge Physician: Sindi Barnes PA-C  Discharging Service: Hospitalist      Primary Provider: No Ref-Primary, Physician  Primary Care Physician Phone Number: None         Primary Discharge Diagnoses:    Natasha Matson was admitted on 6/18/2019 for concerns of acute chest pain.     1. Atypical chest pain: Ruled out ACS.   -Suspect non-cardiac in nature. ECG showed sinus tachycardia, serial troponins undetectable, telemetry Sinus Rhythm/Sinus tachycardia up to 120, and stress test negative for ischemia. Patient reports pain is pleuritic; not reproducible to palpation or exertional in nature.  -Patient has had ongoing bilateral chest wall pain that she attributes to rib fractures (seen on CT and CXR here, evidence of old fractures with nothing acute), and she states that the pain that brought her in was different (located in anterior chest wall, started acutely while watching TV)  -D dimer elevated but CT chest PE study negative for acute pathology/PE.  -Wonder if she may be experiencing some costochondritis/MSK pain. Suggested alternating APAP and ibuprofen. She asked for something else for pain. Discussed that given her episodes of presyncope/lightheadedness would not want to give a narcotic. She has ultram listed in her med list reviewed by pharmacy, but patient reports she used to take that for neuropathy and hasn't taken it in several years. Will try low dose hydroxyzine prn and see if this helps provide additional relief    2. Episodes of diaphoresis, question of hypoglycemia  Patient reports she has been having lows where she wakes up around 4-6 AM frequently feeling sweaty and shaky and when she checks her blood sugar they have been as low as the  "upper 30s to 50s. Comes up after she eats. Reports checking BG TID before meals scheduled. Breakfast BGs reportedly in 90s-100s, lunchtime 150s-200s, dinner 100s-150s. She has a sliding scale that she seems to calculate in her head but cannot give much regarding specifics (\"I take about 2 units for BG > 200, 2 units for BG if 240 or higher\"). She reports taking Lantus 30 units every morning and HS.   -Recommended reducing PTA Lantus HS to 15 units down from 30 units and close follow up with PCP  -Recommended stopping sliding scale insulin for now as it is unclear what the specific parameters are for her  -Recommended making an appointment with her primary care doctor in [place] within the next week (she is here in town visiting her pregnant daughter), and to call her clinic if she experiences any more lows after making these reductions in her insulin regimen.          Secondary Discharge Diagnoses:     Past Medical History:   Diagnosis Date     Essential hypertension, benign      High cholesterol      Polyneuropathy in diabetes (H)      Type II or unspecified type diabetes mellitus without mention of complication, not stated as uncontrolled      Type II or unspecified type diabetes mellitus without mention of complication, uncontrolled 3/21/07    Hospitalized                Code Status:      Full Code        Brief Hospital Summary:        Reason for your hospital stay      You were evaluated in the hospital for chest discomfort. EKG and cardiac   lab work did NOT show evidence of a heart attack (damage or death of part   of the heart muscle caused by lack of blood flow through the coronary   arteries). Chest imaging did not show evidence of abnormalities. Overnight   heart rhythm monitoring did not show any abnormal rhythm patterns. Your   stress test was normal. You can discharge home today. It is unclear what   caused your chest pain- possibly a musculoskeletal inflammation/discomfort   after getting your back " cracked recently. You should follow up with your   primary care clinician within the next week to go over this hospital stay   and to consider further outpatient work up for your chest discomfort. We   also think that your blood sugars are dropping too low when you check them   early in the morning (low as 39!), and this may be what is causing your   sweating episodes. We will cut your Lantus down in the evening from 30   units to 15 units. For now, we would hold of on the mealtime sliding   scale. Check your blood sugars 3 times a day (and if you start feeling   shaky/lightheaded/dizzy) and record your blood sugar levels to go over   with your primary care doctor. If you keep having low blood sugars, call   your primary care clinic and let them know what is going on so they can   help you adjust your diabetes medications.             Please refer to initial admission history and physical for further details.   Briefly, Natasha Matson was admitted on 6/18/2019 for concerns of acute chest pain.   Initial work up in the ED did not reveal evidence of STEMI or findings consistent with unstable angina or acute coronary ischemia. Pt was registered to the Observation Unit for further evaluation.   Pt ruled out with serial troponins, underwent Stress Echo  that did not show evidence of significant coronary ischemia. Labs were reviewed and significant results addressed. On the day of discharge, pt was pain free, with no complaints of pain. Medications were reviewed and adjustments made as necessary. Pt is instructed to follow up as below.           Significant Labs & Imaging During Hospitalization:        Results for orders placed or performed during the hospital encounter of 06/18/19   XR Chest 2 Views    Narrative    CHEST TWO VIEW   6/18/2019 3:05 PM     HISTORY: Chest pain.    COMPARISON: 3/20/2007.    FINDINGS: The heart size is normal. The lungs are clear. No  pneumothorax or pleural effusion. Old bilateral rib  fractures. Old  left clavicle fracture.      Impression    IMPRESSION: No acute abnormality.    AVIVA NUNN MD   CT Chest Pulmonary Embolism w Contrast    Narrative    CT CHEST PULMONARY EMBOLISM WITH CONTRAST  6/18/2019 3:38 PM    HISTORY:  Chest pain and shortness of breath. Positive D-dimer.    TECHNIQUE: Scans obtained from the apices through the diaphragm with  IV contrast. 58 mL Isovue-370 injected. Radiation dose for this scan  was reduced using automated exposure control, adjustment of the mA  and/or kV according to patient size, or iterative reconstruction  technique.    COMPARISON:  None.    FINDINGS:  Evaluation of the pulmonary arterial system shows no  evidence of embolus. The thoracic aorta is calcified and slightly  tortuous. No aortic aneurysm or dissection. There are coronary artery  atherosclerotic calcifications. The heart size is normal. There is no  mediastinal, hilar or axillary lymph node enlargement. There is mild  dependent atelectasis bilaterally. The lungs are otherwise clear. No  pneumothorax or pleural effusion. Multiple old bilateral rib  fractures. Images through the upper abdomen show no acute  abnormalities. The gallbladder is absent.      Impression    IMPRESSION:  1. There is no pulmonary embolus, aortic aneurysm or dissection.  2. Coronary artery atherosclerotic calcifications.     AVIVA NUNN MD   CBC with platelets differential   Result Value Ref Range    WBC 5.9 4.0 - 11.0 10e9/L    RBC Count 4.05 3.8 - 5.2 10e12/L    Hemoglobin 11.9 11.7 - 15.7 g/dL    Hematocrit 35.5 35.0 - 47.0 %    MCV 88 78 - 100 fl    MCH 29.4 26.5 - 33.0 pg    MCHC 33.5 31.5 - 36.5 g/dL    RDW 12.9 10.0 - 15.0 %    Platelet Count 205 150 - 450 10e9/L    Diff Method Automated Method     % Neutrophils 62.4 %    % Lymphocytes 31.6 %    % Monocytes 4.6 %    % Eosinophils 0.7 %    % Basophils 0.5 %    % Immature Granulocytes 0.2 %    Nucleated RBCs 0 0 /100    Absolute Neutrophil 3.7 1.6 - 8.3 10e9/L     Absolute Lymphocytes 1.9 0.8 - 5.3 10e9/L    Absolute Monocytes 0.3 0.0 - 1.3 10e9/L    Absolute Eosinophils 0.0 0.0 - 0.7 10e9/L    Absolute Basophils 0.0 0.0 - 0.2 10e9/L    Abs Immature Granulocytes 0.0 0 - 0.4 10e9/L    Absolute Nucleated RBC 0.0    Comprehensive metabolic panel   Result Value Ref Range    Sodium 143 133 - 144 mmol/L    Potassium 4.5 3.4 - 5.3 mmol/L    Chloride 113 (H) 94 - 109 mmol/L    Carbon Dioxide 23 20 - 32 mmol/L    Anion Gap 7 3 - 14 mmol/L    Glucose 256 (H) 70 - 99 mg/dL    Urea Nitrogen 19 7 - 30 mg/dL    Creatinine 0.92 0.52 - 1.04 mg/dL    GFR Estimate 69 >60 mL/min/[1.73_m2]    GFR Estimate If Black 80 >60 mL/min/[1.73_m2]    Calcium 8.5 8.5 - 10.1 mg/dL    Bilirubin Total 0.5 0.2 - 1.3 mg/dL    Albumin 3.5 3.4 - 5.0 g/dL    Protein Total 8.1 6.8 - 8.8 g/dL    Alkaline Phosphatase 117 40 - 150 U/L    ALT 21 0 - 50 U/L    AST 16 0 - 45 U/L   Lipase   Result Value Ref Range    Lipase 106 73 - 393 U/L   Troponin I   Result Value Ref Range    Troponin I ES <0.015 0.000 - 0.045 ug/L   D dimer quantitative   Result Value Ref Range    D Dimer 0.9 (H) 0.0 - 0.50 ug/ml FEU   Glucose by meter   Result Value Ref Range    Glucose 227 (H) 70 - 99 mg/dL   Troponin I - Now then in 4 hours x 2   Result Value Ref Range    Troponin I ES <0.015 0.000 - 0.045 ug/L   Troponin I - Now then in 4 hours x 2   Result Value Ref Range    Troponin I ES <0.015 0.000 - 0.045 ug/L   Glucose by meter   Result Value Ref Range    Glucose 142 (H) 70 - 99 mg/dL   Glucose by meter   Result Value Ref Range    Glucose 170 (H) 70 - 99 mg/dL   Glucose by meter   Result Value Ref Range    Glucose 78 70 - 99 mg/dL   Glucose by meter   Result Value Ref Range    Glucose 61 (L) 70 - 99 mg/dL   Glucose by meter   Result Value Ref Range    Glucose 74 70 - 99 mg/dL   EKG 12 lead   Result Value Ref Range    Interpretation ECG Click View Image link to view waveform and result    EKG 12-lead, tracing only   Result Value Ref Range     Interpretation ECG Click View Image link to view waveform and result    Echo Stress Echocardiogram    Narrative    [id number]  [id number]  [id number]  [id number]^AMY^KEILA^DWIGHT           [facility]  Echocardiography Laboratory  [address] [place] [address]        Name: RYAN BOWMAN  MRN: [id number]  : 1960  Study Date: 2019 07:20 AM  Age: 58 yrs  Gender: Female  Patient [facility]: [facility]  Reason For Study: Chest Pain  History: Smoker,Diabetes,Family History of CAD,High cholesterol,Hypertension  Ordering Physician: KEILA REYES  Performed By: Tato Combs RDCS     BSA: 1.7 m2  Height: 64 in  Weight: 145 lb  HR: 81  BP: 132/70 mmHg  Medications: Lisinopril,Crestor,JANUVIA,Propranalol  _____________________________________________________________________________  __        Procedure  Stress Echo Complete.  _____________________________________________________________________________  __        Interpretation Summary  This was a normal stress echocardiogram with no evidence of stress-induced  ischemia.  _____________________________________________________________________________  __     Stress  Exercise was stopped due to fatigue.  The patient did not exhibit any symptoms during exercise.  There was a normal BP response to exercise.  The patient exhibited no chest pain during exercise.  Target Heart Rate was achieved.  The EKG portion of this stress test was negative for inducible ischemia (see  echo results below).  There was a maximum 0.5mm ST segment depression in the inferior lead(s).  Normal resting wall motion and no stress-induced wall motion abnormality.  The visual ejection fraction is estimated at >70%.  Left ventricular cavity size decreases with exercise.  Global LV systolic function augments with exercise.     Baseline  Normal baseline electrocardiogram.  The patient is in normal sinus rhythm.  Normal left ventricular function and wall motion at rest.  The  visual ejection fraction is estimated at 55-60%.  No hemodynamically significant valvular abnormalities on 2D or color flow  imaging.     Stress Results             Protocol:  Shalom          Maximum Predicted HR:   164 bpm             Target HR: 139 bpm        % Maximum Predicted HR: 86 %              Stage  DurationHeart Rate  BP              Comment                   (mm:ss)   (bpm)          Stage 1   3:00      109   130/68          Stage 2   3:00      126   142/64          Stage 3   1:20      141   148/62         RecoveryR  6:00      86    118/66RPP 10236, FAC Average, DUKE 7               Stress Duration:   7:20 mm:ss *        Recovery Time: 6:00 mm:ss         Maximum Stress HR: 141 bpm *           METS:          9     Aortic Valve  Normal tricuspid aortic valve.        Vessels  The ascending aorta is Borderline dilated.  _____________________________________________________________________________  __  MMode/2D Measurements & Calculations     asc Aorta Diam: 3.6 cm                 _____________________________________________________________________________  __           Report approved by: Kb Ring 06/19/2019 09:05 AM              Pending Results:        Unresulted Labs Ordered in the Past 30 Days of this Admission     No orders found for last 61 day(s).              Consultations This Hospital Stay:      No consultations were requested during this admission         Discharge Instructions and Follow-Up:      Follow-up Appointments     Follow-up and recommended labs and tests       Follow up with primary care provider, Physician No Ref-Primary, within 7   days for hospital follow- up.  The following labs/tests are recommended:   Check Blood Sugars three times a day before meals.           Pt instructed to follow up with PCP in 7 days and with Consultant if indicated.   Follow-up Labs None        Discharge Disposition:      Discharged to home         Discharge Medications:        Current Discharge  Medication List      START taking these medications    Details   acetaminophen (TYLENOL) 500 MG tablet Take 1-2 tablets (500-1,000 mg) by mouth 3 times daily    Associated Diagnoses: Acute chest pain      hydrOXYzine (ATARAX) 25 MG tablet Take 0.5-1 tablets (12.5-25 mg) by mouth every 6 hours as needed (adjuvant pain if not relieved with tylenol/ibuprofen)  Qty: 10 tablet, Refills: 0    Associated Diagnoses: Acute chest pain      ibuprofen (ADVIL/MOTRIN) 200 MG tablet Take 1 tablet (200 mg) by mouth every 8 hours as needed for mild pain    Associated Diagnoses: Acute chest pain         CONTINUE these medications which have CHANGED    Details   !! insulin glargine (LANTUS SOLOSTAR PEN) 100 UNIT/ML pen Inject 30 Units Subcutaneous every morning    Associated Diagnoses: Type II or unspecified type diabetes mellitus with neurological manifestations, uncontrolled(250.62) (H)      !! insulin glargine (LANTUS SOLOSTAR PEN) 100 UNIT/ML pen Inject 15 Units Subcutaneous At Bedtime    Associated Diagnoses: Type II or unspecified type diabetes mellitus with neurological manifestations, uncontrolled(250.62) (H)       !! - Potential duplicate medications found. Please discuss with provider.      CONTINUE these medications which have NOT CHANGED    Details   ASPIRIN 81 MG OR TABS one daily  Qty: 100, Refills: 3    Associated Diagnoses: Type II or unspecified type diabetes mellitus without mention of complication, not stated as uncontrolled      gabapentin (NEURONTIN) 600 MG tablet Take 600 mg by mouth 3 times daily      lisinopril (PRINIVIL/ZESTRIL) 20 MG tablet Take 20 mg by mouth daily      propranolol (INDERAL) 10 MG tablet Take 10 mg by mouth daily      rosuvastatin (CRESTOR) 20 MG tablet Take 20 mg by mouth every evening      sitagliptin (JANUVIA) 100 MG tablet Take 100 mg by mouth daily      traMADol (ULTRAM) 50 MG tablet Take 1-2 tablets by mouth every 6 hours as needed for pain.  Qty: 30 tablet, Refills: 0      ALCOHOL  "WIPES 70 % PADS single use pads  Qty: 100, Refills: 5    Associated Diagnoses: Type II or unspecified type diabetes mellitus without mention of complication, not stated as uncontrolled      glucose blood VI test strips (ONE TOUCH ULTRA TEST) strip 1 strip by In Vitro route 4 times daily (before meals and nightly).  Qty: 300 strip, Refills: 3    Associated Diagnoses: Type II or unspecified type diabetes mellitus without mention of complication, not stated as uncontrolled      INSULIN SYRINGE 1CCC/30GX1/2\" MISC per patient preference  Qty: 100, Refills: 5    Associated Diagnoses: Type II or unspecified type diabetes mellitus without mention of complication, not stated as uncontrolled; Type II or unspecified type diabetes mellitus with neurological manifestations, uncontrolled(250.62) (H)      ONETOUCH ULTRASOFT LANCETS MISC test blood sugar 4 times per day and as needed   Qty: 1month, Refills: 3-profile    Associated Diagnoses: Type II or unspecified type diabetes mellitus without mention of complication, not stated as uncontrolled      ORDER FOR DME needle disposition container  Qty: 1, Refills: 0    Associated Diagnoses: Type II or unspecified type diabetes mellitus without mention of complication, not stated as uncontrolled         STOP taking these medications       insulin aspart (NOVOLOG FLEXPEN) 100 UNIT/ML pen Comments:   Reason for Stopping:         Insulin Pen Needle (B-D U/F PEN NEEDLE) 31G X 8 MM MISC Comments:   Reason for Stopping:                   Allergies:         Allergies   Allergen Reactions     Lyrica [Pregabalin]      Nausea     Sulfa Drugs            Condition and Physical on Discharge:      Discharge condition: Stable   Vitals: Blood pressure 99/60, pulse 80, temperature 97.2  F (36.2  C), temperature source Oral, resp. rate 17, height 1.626 m (5' 4\"), weight 65.8 kg (145 lb), SpO2 98 %.  145 lbs 0 oz      GENERAL:  Comfortable.  PSYCH: pleasant, oriented, No acute distress.  HEENT:  PERRLA. " Normal conjunctiva, normal hearing, nasal mucosa and Oropharynx are normal.  NECK:  Supple, no neck vein distention, adenopathy or bruits, normal thyroid.  HEART:  Normal S1, S2 with no murmur, no pericardial rub, gallops or S3 or S4.  LUNGS:  Clear to auscultation, normal Respiratory effort. No wheezing, rales or ronchi.  ABDOMEN:  Soft, no hepatosplenomegaly, normal bowel sounds. Non-tender, non distended.   EXTREMITIES:  No pedal edema, +2 pulses bilateral and equal.  SKIN:  Dry to touch, No rash, wound or ulcerations.  NEUROLOGIC:  CN 2-12 intact, BL 5/5 symmetric upper and lower extremity strength, sensation is intact with no focal deficits.

## 2019-06-19 NOTE — PLAN OF CARE
PRIMARY DIAGNOSIS: CHEST PAIN  OUTPATIENT/OBSERVATION GOALS TO BE MET BEFORE DISCHARGE:  1. Ruled out acute coronary syndrome (negative or stable Troponin):  Yes  2. Pain Status: Pain free.  3. Appropriate provocative testing performed: No  - Stress Test Procedure: Echo  - Interpretation of cardiac rhythm per telemetry tech: SR    4. Cleared by Consultants (if applicable):N/A  5. Return to near baseline physical activity: Yes  Discharge Planner Nurse   Safe discharge environment identified: Yes  Barriers to discharge: No       Entered by: Nikkie Krishna 06/19/2019 5:16 AM     Please review provider order for any additional goals.   Nurse to notify provider when observation goals have been met and patient is ready for discharge.

## 2019-06-19 NOTE — DISCHARGE INSTRUCTIONS
Your hospital follow up appointment has been scheduled for you with Dr. Nicole at [place] [address] for 06/27/2019 at 01:20pm. Please bring your hospital discharge instructions and any new medications with you to your appointment. Please call the clinic at [phone] if you need to reschedule.

## 2019-06-19 NOTE — PLAN OF CARE
ROOM #  213-01    Living Situation (if not independent, order SW consult): Mobile Home w/ , daughter, grandson.   Facility name: N/A  : [place]: [phone]  [place]: [phone]    Activity level at baseline: Independent  Activity level on admit: SBA    Patient registered to observation; given Patient Bill of Rights; given the opportunity to ask questions about observation status and their plan of care.  Patient has been oriented to the observation room, bathroom and call light is in place.    Discussed discharge goals and expectations with patient/family.

## 2019-06-19 NOTE — PROGRESS NOTES
Care Transition Services    Patient admitted with acute chest pain. CTS identified patient with no PCP. x3 ER visits within 6 months. Patient with known h/o DM Hgb A1c 10.7, polyneuropathy in diabetes, HTN and HLP. Met with patient to offer diabetes management support and establish PCP. Patient lives at home with her . She is independent with all ADL's. She reports taking her blood sugar 3x/day before meals and using novolog sliding scale. She has diabetes supplies at home. Discussed and encouraged need for ongoing diabetes management. Patient given diabetes education packet and yovana nithya booklet. Information reviewed with patient including hypoglycemic and hyperglycemia foods. Encouraged patient follow up with her PCP for ongoing health management. Encouraged connecting with a diabetic nurse educator. Pt reports having a PCP in [place]. She is followed by  Family Medicine. Discussed importance of post hospitalization follow up. Patient very receptive and would like CTS to schedule follow up. Appointment scheduled with Dr. Benny Nicole for June 27th at 1:20 pm. No further needs identified.     CTS will continue to follow for discharge planning as needed.   Leobardo Marcum RN BSN CTS  Care Management Team  [facility]

## 2019-06-19 NOTE — PLAN OF CARE
"PRIMARY DIAGNOSIS: CHEST PAIN  OUTPATIENT/OBSERVATION GOALS TO BE MET BEFORE DISCHARGE:  1. Ruled out acute coronary syndrome (negative or stable Troponin):  Neg x2  2. Pain Status: Improved-controlled with oral pain medications.  3. Appropriate provocative testing performed: ECHO ordered for tomorrow.  - Stress Test Procedure: Echo  - Interpretation of cardiac rhythm per telemetry tech: SR, HR 80s, Elevated T-Waves.    4. Cleared by Consultants (if applicable):No  5. Return to near baseline physical activity: Yes  Discharge Planner Nurse   Safe discharge environment identified: Yes  Barriers to discharge: Yes       Entered by: Keerthi Gutierrez 06/18/2019 8:00 PM  /87   Pulse 83   Temp 98.5  F (36.9  C) (Oral)   Resp 20   Ht 1.626 m (5' 4\")   Wt 65.8 kg (145 lb)   SpO2 100%   BMI 24.89 kg/m    A&Ox4. Up independently in room. Remote tele: SR, HR 80s, Elevated T-Waves. Chest pain 8/10, given po Nitro x2, Ultram and Tylenol with relief. One episode of diaphoresis that resolved w/ nitroglycerin administration. Troponin's negative x2. BS active x4, tolerating regular diet w/ no caffeine, passing flatus. Glucose checks. Voiding in adequate amts. Will continue to monitor. ECHO planned for tomorrow. To be on clear liquids w/ no caffeine at midnight.   Please review provider order for any additional goals.   Nurse to notify provider when observation goals have been met and patient is ready for discharge.    "

## 2019-06-19 NOTE — PROGRESS NOTES
Patient's BS 61.   Patient ordering food and drank some orange. Will recheck in 15 min. MD notified. Will continue to monitor.     Addendum:  BS rechecked after orange juice : 74. Pt eating breakfast.

## 2019-06-19 NOTE — PLAN OF CARE
PRIMARY DIAGNOSIS: CHEST PAIN  OUTPATIENT/OBSERVATION GOALS TO BE MET BEFORE DISCHARGE:  1. Ruled out acute coronary syndrome (negative or stable Troponin):  Yes  2. Pain Status: Pain free.  3. Appropriate provocative testing performed: No  - Stress Test Procedure: Echo  - Interpretation of cardiac rhythm per telemetry tech: SR    4. Cleared by Consultants (if applicable):N/A  5. Return to near baseline physical activity: Yes  Discharge Planner Nurse   Safe discharge environment identified: Yes  Barriers to discharge: No       Entered by: Nikkie Krishna 06/19/2019 12:08 AM     Please review provider order for any additional goals.   Nurse to notify provider when observation goals have been met and patient is ready for discharge.    Pt alert and oriented, up with standby assist. Pt with 1 episode of chest pain, points to left upper chest. 1 nitroglycerine given SL brought pain down from 7-8 to 4-5. Pt also given tramadol. Pt sleeping on recheck at 0400.